# Patient Record
Sex: FEMALE | Race: WHITE | NOT HISPANIC OR LATINO | Employment: FULL TIME | URBAN - METROPOLITAN AREA
[De-identification: names, ages, dates, MRNs, and addresses within clinical notes are randomized per-mention and may not be internally consistent; named-entity substitution may affect disease eponyms.]

---

## 2021-06-14 ENCOUNTER — OFFICE VISIT (OUTPATIENT)
Dept: FAMILY MEDICINE CLINIC | Facility: CLINIC | Age: 31
End: 2021-06-14

## 2021-06-14 VITALS
BODY MASS INDEX: 22.79 KG/M2 | WEIGHT: 136.8 LBS | TEMPERATURE: 97.8 F | SYSTOLIC BLOOD PRESSURE: 106 MMHG | HEART RATE: 70 BPM | HEIGHT: 65 IN | OXYGEN SATURATION: 98 % | DIASTOLIC BLOOD PRESSURE: 62 MMHG

## 2021-06-14 DIAGNOSIS — Z02.89 ENCOUNTER FOR PHYSICAL EXAMINATION RELATED TO EMPLOYMENT: Primary | ICD-10-CM

## 2021-06-14 PROCEDURE — KIDS: Performed by: FAMILY MEDICINE

## 2021-06-14 RX ORDER — ACETAMINOPHEN AND CODEINE PHOSPHATE 120; 12 MG/5ML; MG/5ML
1 SOLUTION ORAL DAILY
COMMUNITY
Start: 2021-05-26 | End: 2022-04-28 | Stop reason: HOSPADM

## 2021-06-14 RX ORDER — FLUTICASONE PROPIONATE 50 MCG
2 SPRAY, SUSPENSION (ML) NASAL DAILY
COMMUNITY
Start: 2021-03-31 | End: 2022-04-28 | Stop reason: HOSPADM

## 2021-06-14 NOTE — PROGRESS NOTES
Assessment/Plan:  Physical form completed  See chart copy  1  Encounter for physical examination related to employment          Subjective:      Patient ID: Constantino Quinonez is a 35 y o  female  Patient here for [employer]   Generally feeling well  No concerns or complaints today  The following portions of the patient's history were reviewed and updated as appropriate: allergies, current medications, past family history, past medical history, past social history, past surgical history, and problem list     Review of Systems   Constitutional: Negative  HENT: Negative  Eyes: Negative  Respiratory: Negative  Cardiovascular: Negative  Gastrointestinal: Negative  Endocrine: Negative  Genitourinary: Negative  Musculoskeletal: Negative  Skin: Negative  Allergic/Immunologic: Negative  Neurological: Negative  Hematological: Negative  Psychiatric/Behavioral: Negative  Objective:      /62 (BP Location: Left arm, Patient Position: Sitting, Cuff Size: Adult)   Pulse 70   Temp 97 8 °F (36 6 °C) (Temporal)   Ht 5' 4 5" (1 638 m)   Wt 62 1 kg (136 lb 12 8 oz)   SpO2 98%   BMI 23 12 kg/m²          Physical Exam  Vitals reviewed  Constitutional:       Appearance: She is well-developed  HENT:      Head: Normocephalic and atraumatic  Right Ear: External ear normal  Tympanic membrane is not erythematous or bulging  Left Ear: External ear normal  Tympanic membrane is not erythematous or bulging  Nose: Nose normal       Mouth/Throat:      Mouth: No oral lesions  Pharynx: No oropharyngeal exudate  Eyes:      General: No scleral icterus  Right eye: No discharge  Left eye: No discharge  Conjunctiva/sclera: Conjunctivae normal    Neck:      Thyroid: No thyromegaly  Cardiovascular:      Rate and Rhythm: Normal rate and regular rhythm  Heart sounds: Normal heart sounds  No murmur heard  No friction rub  No gallop  Pulmonary:      Effort: Pulmonary effort is normal  No respiratory distress  Breath sounds: No wheezing or rales  Chest:      Chest wall: No tenderness  Abdominal:      General: Bowel sounds are normal  There is no distension  Palpations: Abdomen is soft  There is no mass  Tenderness: There is no abdominal tenderness  There is no guarding or rebound  Musculoskeletal:         General: No tenderness or deformity  Normal range of motion  Cervical back: Normal range of motion and neck supple  Lymphadenopathy:      Cervical: No cervical adenopathy  Skin:     General: Skin is warm and dry  Coloration: Skin is not pale  Findings: No erythema or rash  Neurological:      Mental Status: She is alert and oriented to person, place, and time  Cranial Nerves: No cranial nerve deficit  Motor: No abnormal muscle tone  Coordination: Coordination normal       Deep Tendon Reflexes: Reflexes are normal and symmetric     Psychiatric:         Behavior: Behavior normal

## 2022-03-16 ENCOUNTER — LAB REQUISITION (OUTPATIENT)
Dept: LAB | Facility: HOSPITAL | Age: 32
End: 2022-03-16

## 2022-03-16 DIAGNOSIS — N93.0 POSTCOITAL AND CONTACT BLEEDING: ICD-10-CM

## 2022-03-16 PROCEDURE — 88305 TISSUE EXAM BY PATHOLOGIST: CPT | Performed by: PATHOLOGY

## 2022-04-25 RX ORDER — CLASCOTERONE 1 G/100G
CREAM TOPICAL 2 TIMES DAILY
COMMUNITY

## 2022-04-25 RX ORDER — VITAMIN B COMPLEX
1000 TABLET ORAL DAILY
COMMUNITY

## 2022-04-25 RX ORDER — LORATADINE 10 MG/1
10 TABLET ORAL DAILY
COMMUNITY

## 2022-04-25 RX ORDER — CLINDAMYCIN PHOSPHATE 10 UG/ML
LOTION TOPICAL 2 TIMES DAILY
COMMUNITY

## 2022-04-25 NOTE — PRE-PROCEDURE INSTRUCTIONS
Pre-Surgery Instructions:   Medication Instructions    cholecalciferol (VITAMIN D3) 25 mcg (1,000 units) tablet Stop 4/25  Do not take morning of surgery    Clascoterone (Winlevi) 1 % CREA Do not apply morning of surgery    clindamycin (CLEOCIN T) 1 % lotion Do not apply morning of surgery    loratadine (CLARITIN) 10 mg tablet Take day of surgery  If needed   sertraline (ZOLOFT) 50 mg tablet Take day of surgery  Covid screening negative as per patient  Fully vaccinated  Reviewed showering and medication instructions  Take medications with a small sip of water  Patient verbalized understanding  Advised NPO after MN and ASC will call with scheduled surgical time

## 2022-04-26 ENCOUNTER — ANESTHESIA EVENT (OUTPATIENT)
Dept: PERIOP | Facility: HOSPITAL | Age: 32
End: 2022-04-26
Payer: COMMERCIAL

## 2022-04-26 PROCEDURE — NC001 PR NO CHARGE: Performed by: OBSTETRICS & GYNECOLOGY

## 2022-04-27 PROBLEM — N70.11 HYDROSALPINX: Status: ACTIVE | Noted: 2022-04-27

## 2022-04-27 NOTE — DISCHARGE INSTRUCTIONS
Post-Gynecologic Surgery Discharge Instructions:  1  No heavy lifting more than one full gallon of milk (about 8 lbs) for 1 week  2  Nothing in the vagina for 6 weeks, or until cleared at your post-operative visit  3  You may take stairs one at a time, touching each step with both feet for the first few days, then as tolerated  4  Call the office for fever greater than 100  4'F, heavy vaginal bleeding, or increasing pain  5  Activity as tolerated  6  Avoid driving if taking narcotic pain medications (Roxicodone, Percocet, Vicodin)  Post Operative Pain Management:  For pain, take 650 mg of tylenol every 6 hours  For cramping, take 600 mg Ibuprofen every 6 hours to relieve  You may alternate these and take them "around the clock" to stay ahead of pain  For example, take 650mg of tylenol at 9 AM, then 600mg of ibuprofen at 12 PM, then 650 of tylenol at 3 PM, and so forth  If you have any questions regarding your prescriptions please call your doctor

## 2022-04-28 ENCOUNTER — HOSPITAL ENCOUNTER (OUTPATIENT)
Facility: HOSPITAL | Age: 32
Setting detail: OUTPATIENT SURGERY
Discharge: HOME | End: 2022-04-28
Attending: OBSTETRICS & GYNECOLOGY | Admitting: OBSTETRICS & GYNECOLOGY
Payer: COMMERCIAL

## 2022-04-28 ENCOUNTER — ANESTHESIA (OUTPATIENT)
Dept: PERIOP | Facility: HOSPITAL | Age: 32
End: 2022-04-28
Payer: COMMERCIAL

## 2022-04-28 VITALS
DIASTOLIC BLOOD PRESSURE: 52 MMHG | SYSTOLIC BLOOD PRESSURE: 91 MMHG | BODY MASS INDEX: 23.18 KG/M2 | TEMPERATURE: 96.9 F | HEIGHT: 65 IN | OXYGEN SATURATION: 100 % | HEART RATE: 73 BPM | WEIGHT: 139.11 LBS | RESPIRATION RATE: 16 BRPM

## 2022-04-28 DIAGNOSIS — N83.8 CYST OF FALLOPIAN TUBE: Primary | ICD-10-CM

## 2022-04-28 DIAGNOSIS — N70.11 CHRONIC SALPINGITIS: ICD-10-CM

## 2022-04-28 LAB
EXT PREGNANCY TEST URINE: NEGATIVE
EXT. CONTROL: NORMAL

## 2022-04-28 PROCEDURE — 88305 TISSUE EXAM BY PATHOLOGIST: CPT | Performed by: PATHOLOGY

## 2022-04-28 PROCEDURE — 81025 URINE PREGNANCY TEST: CPT | Performed by: OBSTETRICS & GYNECOLOGY

## 2022-04-28 PROCEDURE — NC001 PR NO CHARGE: Performed by: OBSTETRICS & GYNECOLOGY

## 2022-04-28 RX ORDER — SODIUM CHLORIDE 9 MG/ML
125 INJECTION, SOLUTION INTRAVENOUS CONTINUOUS
Status: DISCONTINUED | OUTPATIENT
Start: 2022-04-28 | End: 2022-04-28 | Stop reason: HOSPADM

## 2022-04-28 RX ORDER — IBUPROFEN 600 MG/1
600 TABLET ORAL EVERY 6 HOURS PRN
Qty: 30 TABLET | Refills: 0
Start: 2022-04-28

## 2022-04-28 RX ORDER — ONDANSETRON 2 MG/ML
4 INJECTION INTRAMUSCULAR; INTRAVENOUS EVERY 6 HOURS PRN
Status: DISCONTINUED | OUTPATIENT
Start: 2022-04-28 | End: 2022-04-28 | Stop reason: HOSPADM

## 2022-04-28 RX ORDER — OXYCODONE HYDROCHLORIDE AND ACETAMINOPHEN 5; 325 MG/1; MG/1
1 TABLET ORAL EVERY 4 HOURS PRN
Qty: 5 TABLET | Refills: 0 | Status: SHIPPED | OUTPATIENT
Start: 2022-04-28 | End: 2022-05-08

## 2022-04-28 RX ORDER — OXYCODONE HYDROCHLORIDE 5 MG/1
5 TABLET ORAL EVERY 4 HOURS PRN
Status: DISCONTINUED | OUTPATIENT
Start: 2022-04-28 | End: 2022-04-28 | Stop reason: HOSPADM

## 2022-04-28 RX ORDER — DEXAMETHASONE SODIUM PHOSPHATE 10 MG/ML
INJECTION, SOLUTION INTRAMUSCULAR; INTRAVENOUS AS NEEDED
Status: DISCONTINUED | OUTPATIENT
Start: 2022-04-28 | End: 2022-04-28

## 2022-04-28 RX ORDER — HYDROMORPHONE HCL/PF 1 MG/ML
0.5 SYRINGE (ML) INJECTION
Status: DISCONTINUED | OUTPATIENT
Start: 2022-04-28 | End: 2022-04-28

## 2022-04-28 RX ORDER — PROPOFOL 10 MG/ML
INJECTION, EMULSION INTRAVENOUS AS NEEDED
Status: DISCONTINUED | OUTPATIENT
Start: 2022-04-28 | End: 2022-04-28

## 2022-04-28 RX ORDER — ONDANSETRON 2 MG/ML
INJECTION INTRAMUSCULAR; INTRAVENOUS AS NEEDED
Status: DISCONTINUED | OUTPATIENT
Start: 2022-04-28 | End: 2022-04-28

## 2022-04-28 RX ORDER — OXYCODONE HYDROCHLORIDE 5 MG/1
10 TABLET ORAL EVERY 4 HOURS PRN
Status: DISCONTINUED | OUTPATIENT
Start: 2022-04-28 | End: 2022-04-28 | Stop reason: HOSPADM

## 2022-04-28 RX ORDER — LIDOCAINE HYDROCHLORIDE 10 MG/ML
INJECTION, SOLUTION EPIDURAL; INFILTRATION; INTRACAUDAL; PERINEURAL AS NEEDED
Status: DISCONTINUED | OUTPATIENT
Start: 2022-04-28 | End: 2022-04-28

## 2022-04-28 RX ORDER — ONDANSETRON 2 MG/ML
4 INJECTION INTRAMUSCULAR; INTRAVENOUS ONCE AS NEEDED
Status: DISCONTINUED | OUTPATIENT
Start: 2022-04-28 | End: 2022-04-28 | Stop reason: HOSPADM

## 2022-04-28 RX ORDER — ACETAMINOPHEN 325 MG/1
975 TABLET ORAL EVERY 6 HOURS PRN
Status: DISCONTINUED | OUTPATIENT
Start: 2022-04-28 | End: 2022-04-28 | Stop reason: HOSPADM

## 2022-04-28 RX ORDER — IBUPROFEN 600 MG/1
600 TABLET ORAL EVERY 6 HOURS PRN
Status: DISCONTINUED | OUTPATIENT
Start: 2022-04-28 | End: 2022-04-28 | Stop reason: HOSPADM

## 2022-04-28 RX ORDER — MIDAZOLAM HYDROCHLORIDE 2 MG/2ML
INJECTION, SOLUTION INTRAMUSCULAR; INTRAVENOUS AS NEEDED
Status: DISCONTINUED | OUTPATIENT
Start: 2022-04-28 | End: 2022-04-28

## 2022-04-28 RX ORDER — FENTANYL CITRATE/PF 50 MCG/ML
25 SYRINGE (ML) INJECTION
Status: DISCONTINUED | OUTPATIENT
Start: 2022-04-28 | End: 2022-04-28 | Stop reason: HOSPADM

## 2022-04-28 RX ORDER — ROCURONIUM BROMIDE 10 MG/ML
INJECTION, SOLUTION INTRAVENOUS AS NEEDED
Status: DISCONTINUED | OUTPATIENT
Start: 2022-04-28 | End: 2022-04-28

## 2022-04-28 RX ORDER — FENTANYL CITRATE 50 UG/ML
INJECTION, SOLUTION INTRAMUSCULAR; INTRAVENOUS AS NEEDED
Status: DISCONTINUED | OUTPATIENT
Start: 2022-04-28 | End: 2022-04-28

## 2022-04-28 RX ORDER — BUPIVACAINE HYDROCHLORIDE 5 MG/ML
INJECTION, SOLUTION PERINEURAL AS NEEDED
Status: DISCONTINUED | OUTPATIENT
Start: 2022-04-28 | End: 2022-04-28 | Stop reason: HOSPADM

## 2022-04-28 RX ADMIN — ROCURONIUM BROMIDE 40 MG: 50 INJECTION, SOLUTION INTRAVENOUS at 10:33

## 2022-04-28 RX ADMIN — PROPOFOL 200 MG: 10 INJECTION, EMULSION INTRAVENOUS at 10:33

## 2022-04-28 RX ADMIN — FENTANYL CITRATE 100 MCG: 50 INJECTION INTRAMUSCULAR; INTRAVENOUS at 10:33

## 2022-04-28 RX ADMIN — ONDANSETRON 4 MG: 2 INJECTION INTRAMUSCULAR; INTRAVENOUS at 11:00

## 2022-04-28 RX ADMIN — LIDOCAINE HYDROCHLORIDE 80 MG: 10 INJECTION, SOLUTION EPIDURAL; INFILTRATION; INTRACAUDAL; PERINEURAL at 10:33

## 2022-04-28 RX ADMIN — MIDAZOLAM 2 MG: 1 INJECTION INTRAMUSCULAR; INTRAVENOUS at 10:23

## 2022-04-28 RX ADMIN — DEXAMETHASONE SODIUM PHOSPHATE 8 MG: 10 INJECTION INTRAMUSCULAR; INTRAVENOUS at 10:54

## 2022-04-28 RX ADMIN — SODIUM CHLORIDE 125 ML/HR: 0.9 INJECTION, SOLUTION INTRAVENOUS at 09:03

## 2022-04-28 RX ADMIN — SUGAMMADEX 300 MG: 100 INJECTION, SOLUTION INTRAVENOUS at 11:05

## 2022-04-28 NOTE — OP NOTE
OPERATIVE REPORT  PATIENT NAME: Caitlyn Noonan    :  1990  MRN: [id number]  Pt Location: [facility] ROOM 06    SURGERY DATE: 2022    Surgeon(s) and Role:     Jory Pratt DO - Primary     * Evangelina Monahan DO - Assisting    Preop Diagnosis:  Chronic salpingitis [N70 11]    Post-Op Diagnosis Codes:     * Chronic salpingitis [N70 11]    Procedure(s) (LRB):  LAPAROSCOPY DIAGNOSTIC (N/A)  EXCISION CYST TUBAL (Left)    Specimen(s):  ID Type Source Tests Collected by Time Destination   1 : paratubal cyst Tissue Fallopian Tube, Left TISSUE EXAM Gwene No, DO 2022 1101        Estimated Blood Loss: 5 cc    Anesthesia Type: GETA    Operative Indications:  Chronic salpingitis [N70 11]  Suspected hydrosalpinx    Operative Findings:  1  Normal external female genitalia  2  No difficulty with laparoscopic entry  3  Normal appearing uterus, bilateral ovaries  4  Two para tubular cysts seen on the left side, no hydrosalpinx noted  Please see photos in media tab  These were removed with the EnSeal device  Complications:   None    Procedure and Technique:    Patient was taken to the operating room where correct patient and correct procedure were confirmed  General endotracheal anesthesia (GET) was administered and the patient was positioned on the OR table in the dorsal lithotomy position  All pressure points were padded and a yohannes hugger was placed to maintain control of core body temperature  The patient was prepped and draped in the usual sterile fashion with chloroprep on the abdomen and chlorhexidine prep on the vagina and perineum  A time out was performed  Operative Technique    A straight catheter was introduced into the bladder, which was drained of 100 cc of clear yellow urine  A sponge stick was inserted into the vagina for manipulation  Sterile gloves were then exchanged and attention was turned to the abdomen       A 5mm incision was made at the inferior edge of the umbilicus for introduction of a 5 mm trocar  Trocar was introduced under direct visualization  Pneumoperitoneum was then established to a maximum of 15mmHg  The entire abdomen and pelvis was inspected and there was no evidence of injury to bowel, bladder, vasculature, or other structures  Attention was then turned to the pelvis  Patient was placed in Trendelenburg and the uterus was elevated to visualize the fallopian tubes  There was noted to be grossly normal tubes and ovaries bilaterally with 2 small para tubular cyst seen on left side  An additional port sites was selected on the right lower abdomen approximately 2cm superior and medial to the iliac crests  A 5 mm incision was made for introduction of a 5 mm trocar under direct visualization  A [place] grasper was inserted through this port and used to visualize the pelvis  The paratubular cysts were removed with the EnSeal device and good hemostasis was noted    The pneumoperitoneum was allowed to escape  Adequate hemostasis was visualized  The inferior trocars were removed under direct visualization  She was given Valsalva breaths to reduce postoperative shoulder pain by the anesthesiologist  The laparoscope was withdrawn from the abdomen, followed by its trocar sleeve at the umbilicus  Skin incisions were closed with 4-0 monocryl with exofin  Attention was turned to the vagina and the sponge stick was removed    At the conclusion of the procedure, all needle, sponge, and instrument counts were noted to be correct x2  She was given sugammadex for post anesthetic reversal  Patient tolerated the procedure well and was transferred to PACU in stable condition prior to discharge with follow up in 1-2 weeks  Dr Sumanth May was present and participated in the entire case         Patient Disposition:  PACU       SIGNATURE: Jones Cintron DO  DATE: April 28, 2022  TIME: 11:11 AM

## 2022-04-28 NOTE — INTERVAL H&P NOTE
Vitals:    04/28/22 0847   BP: 114/70   Pulse: 76   Resp: 16   Temp: 98 4 °F (36 9 °C)   SpO2: 100%     H&P reviewed  After examining the patient I find no changes in the patient's condition since the H&P had been written

## 2022-04-28 NOTE — ANESTHESIA PREPROCEDURE EVALUATION
Procedure:  SALPINGECTOMY, LAPAROSCOPIC (Left Uterus)    Relevant Problems   ANESTHESIA (within normal limits)      NEURO/PSYCH   (+) OCD (obsessive compulsive disorder)      Other   (+) Brain lesion   (+) Hydrosalpinx        Physical Exam    Airway    Mallampati score: II  TM Distance: >3 FB  Neck ROM: full     Dental   No notable dental hx     Cardiovascular  Rhythm: regular, Rate: normal, Cardiovascular exam normal    Pulmonary  Pulmonary exam normal Breath sounds clear to auscultation,     Other Findings        Anesthesia Plan  ASA Score- 2     Anesthesia Type- general with ASA Monitors  Additional Monitors:   Airway Plan: ETT  Plan Factors-Exercise tolerance (METS): >4 METS  Chart reviewed  Existing labs reviewed  Patient summary reviewed  Patient is not a current smoker  Induction- intravenous  Postoperative Plan-     Informed Consent- Anesthetic plan and risks discussed with patient

## 2022-09-01 ENCOUNTER — TELEPHONE (OUTPATIENT)
Dept: HEMATOLOGY ONCOLOGY | Facility: CLINIC | Age: 32
End: 2022-09-01

## 2022-09-01 NOTE — TELEPHONE ENCOUNTER
09/01/22    Spoke with pt today checking If pt has any labs done for his upcoming appt  She is referred for APS but I didn't see any related-labs in  Pt reports she has elevated antiphospholipid antibodies and hx of 5 miscarriages  She states her OB/GYN  ordered the tests in July and she will email them to me  My direct email address provided

## 2022-09-01 NOTE — TELEPHONE ENCOUNTER
Pt's labs received and uploaded to her chart- media  As Dr Josefina Mccrary request, pt will need to eval and her labs need to be interpreted by more experienced hematologist  I r/s pt to see Dr Jone Cm on 9/20 and pt is notified  Pt verbalized understanding and agreement  She knows she can call us back with any questions

## 2022-09-20 ENCOUNTER — CONSULT (OUTPATIENT)
Dept: HEMATOLOGY ONCOLOGY | Facility: CLINIC | Age: 32
End: 2022-09-20
Payer: COMMERCIAL

## 2022-09-20 VITALS
OXYGEN SATURATION: 98 % | HEIGHT: 64 IN | HEART RATE: 75 BPM | BODY MASS INDEX: 23.73 KG/M2 | RESPIRATION RATE: 16 BRPM | SYSTOLIC BLOOD PRESSURE: 100 MMHG | WEIGHT: 139 LBS | DIASTOLIC BLOOD PRESSURE: 60 MMHG | TEMPERATURE: 98.2 F

## 2022-09-20 DIAGNOSIS — D68.61 ANTIPHOSPHOLIPID ANTIBODY SYNDROME (HCC): ICD-10-CM

## 2022-09-20 PROCEDURE — 99204 OFFICE O/P NEW MOD 45 MIN: CPT | Performed by: INTERNAL MEDICINE

## 2022-09-20 RX ORDER — LEVOTHYROXINE SODIUM 0.03 MG/1
TABLET ORAL DAILY
COMMUNITY
Start: 2022-08-10

## 2022-09-20 RX ORDER — ASPIRIN 81 MG/1
81 TABLET ORAL DAILY
COMMUNITY

## 2022-09-24 LAB
LA PPP-IMP: NORMAL
MIXING STUDIES PPP-IMP: NORMAL
SCREEN APTT: 32 SEC
SCREEN DRVVT: 33 SEC

## 2022-09-24 NOTE — PROGRESS NOTES
Oncology Outpatient Consult Note  Sujit Moran 28 y o  female MRN: @ Encounter: [id number]        Date:  9/24/2022        CC: multiple miscarriages        HPI:  Sujit Moran is seen for initial consultation 9/24/2022 regarding her multiple miscarriages  He history is as follows:    1 miscarriage at 5 5 weeks  Positive LA screen documented at [facility] [place] after the miscarriage   1 live birth  1 miscarriage at 10 weeks  1 miscarriage at 5 weeks  1 live birth with a vanishing twin - took a baby ASA for this pregnancy  I miscarriage at 5 weeks (this was in June 2022)    The patient is currently 6 weeks pregnant  She had a positive LA screen at [place] in 2016 and a low titer of 16 for anti-cardiolipin IgM  These labs were done at the time because she was being worked up for neurologic symptoms  She has also tested negative for FVL  She has never had a thrombotic event in the past   She is on a baby ASA for this pregnancy  Her gyn also sent a phosphatidylserine titer that was 40  Current titers of anticardiolipin and beta2 glycoprotein antibodies are negative  There is no recent LA screen  She denies any bleeding during the pregnancy so far  On 4/28/22 she had salpingitis with removed at the tube  She currently works as a nurse for [employer]  No tob or EToh  Famhx:    MGM - ovarian cancer  PGM - overian cancer  MGF - colon cancer  Mat uncle - colon cancer    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  ROS: As stated in history of present illness otherwise her 14 point review of systems today was negative  ECOG PS: 0    Test Results:    Imaging: No results found      Labs: No results found for: WBC, HGB, HCT, MCV, PLT  No results found for: NA, K, CL, CO2, ANIONGAP, BUN, CREATININE, GLUCOSE, GLUF, CALCIUM, CORRECTEDCA, AST, ALT, ALKPHOS, PROT, BILITOT, EGFR      No results found for: SPEP, UPEP    No results found for: PSA    No results found for: CEA    No results found for: AFP    No results found for: IRON, TIBC, FERRITIN    No results found for: INRUPRCM81            Active Problems:   Patient Active Problem List   Diagnosis    Cyst of fallopian tube    Brain lesion    OCD (obsessive compulsive disorder)       Past Medical History:   Past Medical History:   Diagnosis Date    Acne     Anxiety     Brain lesion     serial MRI'S-since  cleared    Irritable bowel syndrome     Migraines     OCD (obsessive compulsive disorder)     Seasonal allergies     Wears glasses        Surgical History:   Past Surgical History:   Procedure Laterality Date    DILATION AND CURETTAGE OF UTERUS      EXCISION CYST TUBAL Left 4/28/2022    Procedure: EXCISION CYST TUBAL;  Surgeon: Claudia Sun DO;  [facility];  Service: Gynecology    WA LAP,DIAGNOSTIC ABDOMEN N/A 4/28/2022    Procedure: LAPAROSCOPY DIAGNOSTIC;  Surgeon: Claudia Sun DO;  [facility];  Service: Gynecology    TONSILLECTOMY         Family History:  No family history on file  Cancer-related family history is not on file  Social History:   Social History     Socioeconomic History    Marital status: /Civil Union     Spouse name: Not on file    Number of children: Not on file    Years of education: Not on file    Highest education level: Not on file   Occupational History    Not on file   Tobacco Use    Smoking status: Never Smoker    Smokeless tobacco: Never Used   Vaping Use    Vaping Use: Never used   Substance and Sexual Activity    Alcohol use:  Yes     Alcohol/week: 1 0 standard drink     Types: 1 Standard drinks or equivalent per week     Comment: 3  x  weekly    Drug use: Never    Sexual activity: Yes   Other Topics Concern    Not on file   Social History Narrative    Not on file     Social Determinants of Health     Financial Resource Strain: Not on file   Food Insecurity: Not on file   Transportation Needs: Not on file   Physical Activity: Not on file   Stress: Not on file   Social Connections: Not on file   Intimate Partner Violence: Not on file   Housing Stability: Not on file       Current Medications:   Current Outpatient Medications   Medication Sig Dispense Refill    aspirin (ECOTRIN LOW STRENGTH) 81 mg EC tablet Take 81 mg by mouth daily      levothyroxine 25 mcg tablet in the morning      loratadine (CLARITIN) 10 mg tablet Take 10 mg by mouth daily      Prenatal Vit-Fe Fumarate-FA (PRENATAL COMPLETE PO) Take by mouth in the morning      progesterone (ENDOMETRIN) 100 MG vaginal insert Insert 100 mg into the vagina 2 (two) times a day      sertraline (ZOLOFT) 50 mg tablet       cholecalciferol (VITAMIN D3) 25 mcg (1,000 units) tablet Take 1,000 Units by mouth daily (Patient not taking: Reported on 9/20/2022)      Clascoterone (Winlevi) 1 % CREA Apply topically 2 (two) times a day (Patient not taking: Reported on 9/20/2022)      clindamycin (CLEOCIN T) 1 % lotion Apply topically 2 (two) times a day (Patient not taking: Reported on 9/20/2022)      ibuprofen (MOTRIN) 600 mg tablet Take 1 tablet (600 mg total) by mouth every 6 (six) hours as needed for moderate pain (Patient not taking: Reported on 9/20/2022) 30 tablet 0     No current facility-administered medications for this visit  Allergies: Allergies   Allergen Reactions    Other Allergic Rhinitis     Patient has seasonal allergies - but nothing that requires any special medication         Physical Exam:    Body surface area is 1 68 meters squared      Wt Readings from Last 3 Encounters:   09/20/22 63 kg (139 lb)   04/28/22 63 1 kg (139 lb 1 8 oz)   06/14/21 62 1 kg (136 lb 12 8 oz)        Temp Readings from Last 3 Encounters:   09/20/22 98 2 °F (36 8 °C) (Temporal)   04/28/22 (!) 96 9 °F (36 1 °C) (Temporal)   06/14/21 97 8 °F (36 6 °C) (Temporal)        BP Readings from Last 3 Encounters:   09/20/22 100/60   04/28/22 91/52   06/14/21 106/62         Pulse Readings from Last 3 Encounters:   09/20/22 75   04/28/22 73 06/14/21 70     @LASTSAO2(3)@    Physical Exam     Constitutional   General appearance: No acute distress, well appearing and well nourished  Eyes   Conjunctiva and lids: No swelling, erythema or discharge  Pupils and irises: Equal, round and reactive to light  Ears, Nose, Mouth, and Throat   External inspection of ears and nose: Normal     Nasal mucosa, septum, and turbinates: Normal without edema or erythema  Oropharynx: Normal with no erythema, edema, exudate or lesions  Pulmonary   Respiratory effort: No increased work of breathing or signs of respiratory distress  Auscultation of lungs: Clear to auscultation  Cardiovascular   Palpation of heart: Normal PMI, no thrills  Auscultation of heart: Normal rate and rhythm, normal S1 and S2, without murmurs  Examination of extremities for edema and/or varicosities: Normal     Carotid pulses: Normal     Abdomen   Abdomen: Non-tender, no masses  Liver and spleen: No hepatomegaly or splenomegaly  Lymphatic   Palpation of lymph nodes in neck: No lymphadenopathy  Musculoskeletal   Gait and station: Normal     Digits and nails: Normal without clubbing or cyanosis  Inspection/palpation of joints, bones, and muscles: Normal     Skin   Skin and subcutaneous tissue: Normal without rashes or lesions  Neurologic   Cranial nerves: Cranial nerves 2-12 intact  Sensation: No sensory loss  Psychiatric   Orientation to person, place, and time: Normal     Mood and affect: Normal           Assessment/ Plan:  28 female with multiple miscarriages and 2 live births  Phosphatidylserine is not a standard part of the workup for MIR as it is not a very reliable test   It is not part of the laboratory criteria for APLA  I am going to repeat a LA schedule as this was positive in the past   If this is positive, I will start her on prophyactic lovenox at 40 mg daily for this pregnancy    I am in agreement with continuing the baby ASA throughout this pregnancy  I will call her with that result  If APLA is diagnosed and she does need lovenox, I will see her closer to delivery to make the transition to heparin  She also has a RAD51D mutation  There are no recommendations for cancer screening or surgical risk reduction until age 36  We will decide on follow up after her LA screen is resulted  I spent 45 minutes in chart review, face to face counseling, coordination of care, and documentation

## 2022-09-30 ENCOUNTER — TELEPHONE (OUTPATIENT)
Dept: HEMATOLOGY ONCOLOGY | Facility: CLINIC | Age: 32
End: 2022-09-30

## 2022-09-30 NOTE — TELEPHONE ENCOUNTER
I called the patient to let her know that her LA screen was negative  At this time, I do not have a reason to add lovenox as she does not meet lab criteria for APLA  She can continue to ASA throughout the pregnancy  I would stop it 7 days prior to due date if she wants to have an epidural   I will see her in follow up on as needed basis

## 2024-08-03 ENCOUNTER — APPOINTMENT (OUTPATIENT)
Dept: LAB | Facility: MEDICAL CENTER | Age: 34
End: 2024-08-03
Payer: COMMERCIAL

## 2024-08-03 DIAGNOSIS — R10.11 RUQ PAIN: ICD-10-CM

## 2024-08-03 DIAGNOSIS — K80.20 GALLBLADDER STONE WITHOUT CHOLECYSTITIS OR OBSTRUCTION: ICD-10-CM

## 2024-08-03 LAB
ALBUMIN SERPL BCG-MCNC: 4.3 G/DL (ref 3.5–5)
ALP SERPL-CCNC: 44 U/L (ref 34–104)
ALT SERPL W P-5'-P-CCNC: 13 U/L (ref 7–52)
ANION GAP SERPL CALCULATED.3IONS-SCNC: 5 MMOL/L (ref 4–13)
AST SERPL W P-5'-P-CCNC: 14 U/L (ref 13–39)
BILIRUB SERPL-MCNC: 0.6 MG/DL (ref 0.2–1)
BUN SERPL-MCNC: 12 MG/DL (ref 5–25)
CALCIUM SERPL-MCNC: 9.1 MG/DL (ref 8.4–10.2)
CHLORIDE SERPL-SCNC: 105 MMOL/L (ref 96–108)
CO2 SERPL-SCNC: 29 MMOL/L (ref 21–32)
CREAT SERPL-MCNC: 0.77 MG/DL (ref 0.6–1.3)
GFR SERPL CREATININE-BSD FRML MDRD: 102 ML/MIN/1.73SQ M
GLUCOSE P FAST SERPL-MCNC: 87 MG/DL (ref 65–99)
LIPASE SERPL-CCNC: 16 U/L (ref 11–82)
POTASSIUM SERPL-SCNC: 3.9 MMOL/L (ref 3.5–5.3)
PROT SERPL-MCNC: 7 G/DL (ref 6.4–8.4)
SODIUM SERPL-SCNC: 139 MMOL/L (ref 135–147)

## 2024-08-03 PROCEDURE — 80053 COMPREHEN METABOLIC PANEL: CPT

## 2024-08-03 PROCEDURE — 36415 COLL VENOUS BLD VENIPUNCTURE: CPT

## 2024-08-03 PROCEDURE — 83690 ASSAY OF LIPASE: CPT

## 2025-03-25 ENCOUNTER — OFFICE VISIT (OUTPATIENT)
Dept: FAMILY MEDICINE CLINIC | Facility: CLINIC | Age: 35
End: 2025-03-25
Payer: COMMERCIAL

## 2025-03-25 VITALS
HEART RATE: 73 BPM | HEIGHT: 64 IN | SYSTOLIC BLOOD PRESSURE: 110 MMHG | DIASTOLIC BLOOD PRESSURE: 72 MMHG | WEIGHT: 131.4 LBS | TEMPERATURE: 97.2 F | BODY MASS INDEX: 22.43 KG/M2 | OXYGEN SATURATION: 98 %

## 2025-03-25 DIAGNOSIS — Z12.4 SCREENING FOR CERVICAL CANCER: ICD-10-CM

## 2025-03-25 DIAGNOSIS — Z02.89 ENCOUNTER FOR PHYSICAL EXAMINATION RELATED TO EMPLOYMENT: Primary | ICD-10-CM

## 2025-03-25 DIAGNOSIS — Z11.1 SCREENING FOR TUBERCULOSIS: ICD-10-CM

## 2025-03-25 PROCEDURE — KIDS: Performed by: FAMILY MEDICINE

## 2025-03-25 NOTE — PROGRESS NOTES
"Name: Lorene Dalal      : 1990      MRN: [id number]  Encounter Provider: Kenton Mai DO  Encounter Date: 3/25/2025   Encounter department: [facility]  :  Assessment & Plan  Encounter for physical examination related to employment  [facility] physical form completed.  See chart copy.       Screening for cervical cancer         Screening for tuberculosis    Orders:    Quantiferon TB Gold Plus Assay; Future           History of Present Illness   Patient is seen today for [facility] physical.  She is generally feeling well.  She is requesting an order be put in for just testing with QuantiFERON gold testing.      Review of Systems   Constitutional: Negative.    HENT: Negative.     Eyes: Negative.    Respiratory: Negative.     Cardiovascular: Negative.    Gastrointestinal: Negative.    Endocrine: Negative.    Genitourinary: Negative.    Musculoskeletal: Negative.    Skin: Negative.    Allergic/Immunologic: Negative.    Neurological: Negative.    Hematological: Negative.    Psychiatric/Behavioral: Negative.         Objective   /72 (BP Location: Left arm, Patient Position: Sitting, Cuff Size: Standard)   Pulse 73   Temp (!) 97.2 °F (36.2 °C) (Tympanic)   Ht 5' 4\" (1.626 m)   Wt 59.6 kg (131 lb 6.4 oz)   SpO2 98%   BMI 22.55 kg/m²      Physical Exam  Constitutional:       General: She is not in acute distress.     Appearance: She is well-developed. She is not diaphoretic.   HENT:      Head: Normocephalic and atraumatic.      Right Ear: External ear normal.      Left Ear: External ear normal.      Nose: Nose normal.      Mouth/Throat:      Pharynx: Oropharynx is clear.   Eyes:      General: No scleral icterus.        Right eye: No discharge.         Left eye: No discharge.      Conjunctiva/sclera: Conjunctivae normal.      Pupils: Pupils are equal, round, and reactive to light.   Neck:      Thyroid: No thyromegaly.      Vascular: No JVD.      Trachea: No tracheal " deviation.   Cardiovascular:      Rate and Rhythm: Normal rate and regular rhythm.      Heart sounds: Normal heart sounds. No murmur heard.     No friction rub. No gallop.   Pulmonary:      Effort: Pulmonary effort is normal. No respiratory distress.      Breath sounds: Normal breath sounds. No wheezing or rales.   Chest:      Chest wall: No tenderness.   Abdominal:      General: Bowel sounds are normal. There is no distension.      Palpations: Abdomen is soft. There is no mass.      Tenderness: There is no abdominal tenderness. There is no guarding or rebound.      Hernia: No hernia is present.   Musculoskeletal:         General: No tenderness or deformity. Normal range of motion.      Cervical back: Normal range of motion and neck supple.   Lymphadenopathy:      Cervical: No cervical adenopathy.   Skin:     General: Skin is warm and dry.      Capillary Refill: Capillary refill takes less than 2 seconds.      Coloration: Skin is not pale.      Findings: No erythema or rash.   Neurological:      Mental Status: She is alert and oriented to person, place, and time.      Cranial Nerves: No cranial nerve deficit.      Sensory: No sensory deficit.      Motor: No abnormal muscle tone.      Coordination: Coordination normal.      Deep Tendon Reflexes: Reflexes normal.   Psychiatric:         Mood and Affect: Mood normal.         Behavior: Behavior normal.

## 2025-03-26 LAB
GAMMA INTERFERON BACKGROUND BLD IA-ACNC: 0.1 IU/ML
M TB IFN-G BLD-IMP: NEGATIVE
M TB IFN-G CD4+ BCKGRND COR BLD-ACNC: 0 IU/ML
M TB IFN-G CD4+ BCKGRND COR BLD-ACNC: 0 IU/ML
MITOGEN IGNF BLD-ACNC: >10 IU/ML
QUANTIFERON INCUBATION COMMENT: NORMAL

## 2025-03-27 ENCOUNTER — RESULTS FOLLOW-UP (OUTPATIENT)
Dept: FAMILY MEDICINE CLINIC | Facility: CLINIC | Age: 35
End: 2025-03-27

## 2025-04-02 ENCOUNTER — EVALUATION (OUTPATIENT)
Dept: PHYSICAL THERAPY | Facility: CLINIC | Age: 35
End: 2025-04-02
Payer: COMMERCIAL

## 2025-04-02 DIAGNOSIS — N81.6 PELVIC ORGAN PROLAPSE QUANTIFICATION STAGE 2 RECTOCELE: ICD-10-CM

## 2025-04-02 DIAGNOSIS — N81.10 PELVIC ORGAN PROLAPSE QUANTIFICATION STAGE 2 CYSTOCELE: Primary | ICD-10-CM

## 2025-04-02 PROCEDURE — 97530 THERAPEUTIC ACTIVITIES: CPT | Performed by: PHYSICAL THERAPIST

## 2025-04-02 PROCEDURE — 97162 PT EVAL MOD COMPLEX 30 MIN: CPT | Performed by: PHYSICAL THERAPIST

## 2025-04-02 NOTE — PROGRESS NOTES
PT Evaluation     Today's date: 2025  Patient name: Lorene Dalal  : 1990  MRN: [id number]  Referring provider: Eric Stephenson PA-C  Dx:   Encounter Diagnosis     ICD-10-CM    1. Pelvic organ prolapse quantification stage 2 cystocele  N81.10       2. Pelvic organ prolapse quantification stage 2 rectocele  N81.6                      Assessment  Impairments: activity intolerance, impaired physical strength, lacks appropriate home exercise program, pain with function, poor posture , poor body mechanics, activity limitations and endurance  Symptom irritability: moderate  Understanding of Dx/Px/POC: excellent     Prognosis: excellent    Goals  (Up to 8 weeks)  1. Independent and safe with PF HEP.  2. Independent and safe with body mechanics and PFM activation to prevent VADIM with dynamic ADL's.  3. Independent and safe with correct abdominal breathing tech to prevent straining with teherx/HEP.  4. PF MMT increases by 1/2-1 grade t/o to min abdominal pressure with standing ADL's/exercises.    Plan  Patient would benefit from: skilled physical therapy    Planned therapy interventions: manual therapy, neuromuscular re-education, patient/caregiver education, postural training, strengthening, stretching, therapeutic activities, therapeutic exercise, home exercise program, graded exercise, graded activity, flexibility, behavior modification, breathing training, activity modification, body mechanics training and abdominal trunk stabilization    Frequency: 1x week  Duration in weeks: 8  Treatment plan discussed with: patient        PT Pelvic Floor Subjective:   History of Present Illness:   Pt is 35 y/o female with Hx of pelvic pressure for years, Recently pt started an intense core exercise program that worsen her PF symptoms. Pt went to see her OBGYN and was referred to [facility] for PF revaluation and tx.  Current functional limitations: (+) straining with BM, multiple BM in a day, pelvic pressure with dynamic  "ADL's, feeling of PF heaviness and almost \"crowing\" in perineum post BM.Date of onset: 1/1/2025          Recurrent probem    Quality of life: fair    Social Support:     Lives in:  Multiple-level home    Lives with:  Spouse and young children    Relationship status: /committed    Work status: employed part time (a nurse: combination of sitting/standing)    Life stress level: 4    Life stress severity: moderate    History of Depression: yesPronouns: she/her  Hand dominance:  Left  Diet and Exercise:    Diet:balanced nutrition    Exercise type: strengthening exercise program, combination activity and walking    Exercise frequency: daily  OB/ gyn History    Gestational History:     Prior Pregnancy: Yes      Number of prior pregnancies: 7+    Number of term pregnancies: 3    Delivery Type: vaginal delivery      Number of vaginal deliveries: 3 (2015:7.8 lbs, 2019: 8.lbs, 2023: 8.3 lbs)    Delivery Complications:  Fist child: (+) use of forceps  no delivery complications    Menstrual History:    Date of last menstrual period: 4/2/2025    Menstrual irregularities regular menses    Painful periods:  No difficulty managing menstrual pain    Tolerates tampons: no    Birth control: no contraception  Bladder Function:     Voiding Difficulties negative for: urgency, frequent urination, straining and incomplete emptying       Voiding Difficulties comments:     Voiding frequency: every 3-4 hours    Urinary leakage: urine leakage    Urinary leakage aggravated by: sneezing and exercise (jumping)    Urinary leakage not aggravated by: post-void dribble    Painful urination: No      Fluid Intake Type:  Water, tea, coffee and alcohol    Intake (ounces): Water intake (oz): 40 oz. Coffee intake (oz): 12 oz. Tea intake (oz): 12 oz herbal. Alcohol intake (oz): 3 glasses on the weekends.  Incontinence Management:     Pads/Diaper Use:  None    Patient has attempted at least 4 weeks of independent pelvic floor strengthening exercises " "without a resolution of symptoms  Bowel Function:     Voiding DIfficulties: unfinished feeling after defecating      Bowel Function comments:  Already use a step stool at home for BM.    Bowel frequency: daily and multiple times a day    [place] Stool Scale: type 4    Stool softener use: no stool softeners    Enema use: no enema    Uses \"squatty potty\": Squatty Potty  Sexual Function:     Sexually Active:  Sexually active    Pain during intercourse: Yes      Lubrication Use: No      pain does not cause abstinenceSexual function: vaginal pain  Pain:     No pain reported by patient.    Aggravating factors:  [place]    Duration of symptoms:  Brief    Relieving factors:  Change in position and relaxation    Progression:  No change  Diagnostic Tests:     None    Treatments:     Previous treatment:  Physical therapy  Patient Goals:     Patient goals for therapy:  Fully empty bladder or bowels, improved bladder or bowel function, improved comfort, improved quality of life, decreased pain and improved pain management    Other patient goals:  To prevent worsening of pelvic prolapse      Objective     Postural Observations  Seated posture: fair  Standing posture: fair  Correction of posture: has no consistent effect      Active Range of Motion     Lumbar   Normal active range of motion    Strength/Myotome Testing     Lumbar   Left   Normal strength    Right   Normal strength      Abdominal Assessment:      Abdominal Assessment:  Abdominal findings with curl up: supine with knees flexed.    Diastatis   Diastasis recti present: yes  3\" above umbilicus (# fingers): 2  Umbilicus (# fingers): 2  3\" below umbilicus (# fingers): 1  Connective tissue integrity at linea alba: firm  no tenderness at linea alba  able to engage transverse abdominis     Skin inspection:   scars present.   Additional skin inspection details: Multiple small fully healed, old incisions t/o abdomen. No restrictions, no hypersensitivity.      General " Perineum Exam:   Positive for descent and gaping introitus.     Visual Inspection of Perineum:   Excursion of perineal body in cephalad direction with contraction of pelvic floor muscles (PFM): weak  Excursion of perineal body in caudal direction with relaxation of pelvic floor muscles (PFM): good   Involuntary contraction with coughing: no  Involuntary relaxation with bearing down: no  [place] swab test: non-tender  Cough reflex: cough reflex  Sphincter Tone Resting: normal  Sphincter Tone Squeeze: normal  Sensation: intact  Tenderness: unprovoked    Pelvic Organ Prolapse   Position: hook-lying  At rest: mild  With bearing down: mild (>1cm from hymenal remnants)  Location: anterior and posterior  Perineal body inspection: within normal limits        Pelvic Floor Muscle Exam:     Muscle Contraction: substitution   Breathing pattern with contraction: holding breath   Pelvic floor muscle relaxation is complete.         PERFECT Score   Power right: 2+/5   Power left: 2+/5   Endurance (seconds to max): 3   Repetitions (before fatigue): 3   Fast flicks (in 10 seconds): 3      Rectal Pelvic Floor Muscle Exam    External anal sphincter: wink reflex intact    pelvic floor exam consent given by patient    Pelvic exam completed: vaginally and rectally                Precautions: not any given      Manuals 4/2            PF MMT 2+/5 w decrease quick and prolong hold            PF assessment (+) min rectocele, cystocele                                      Neuro Re-Ed                                                                                                        Ther Ex             Supine PF/TA demo            Abdominal breathing demo                                                                                          Ther Activity             [facility] edu/review 8'                         Gait Training                                       Modalities

## 2025-04-10 ENCOUNTER — OFFICE VISIT (OUTPATIENT)
Dept: PHYSICAL THERAPY | Facility: CLINIC | Age: 35
End: 2025-04-10
Payer: COMMERCIAL

## 2025-04-10 DIAGNOSIS — N81.10 PELVIC ORGAN PROLAPSE QUANTIFICATION STAGE 2 CYSTOCELE: Primary | ICD-10-CM

## 2025-04-10 PROCEDURE — 97112 NEUROMUSCULAR REEDUCATION: CPT | Performed by: PHYSICAL THERAPIST

## 2025-04-10 PROCEDURE — 97530 THERAPEUTIC ACTIVITIES: CPT | Performed by: PHYSICAL THERAPIST

## 2025-04-10 PROCEDURE — 97110 THERAPEUTIC EXERCISES: CPT | Performed by: PHYSICAL THERAPIST

## 2025-04-10 NOTE — PROGRESS NOTES
"Daily Note     Today's date: 4/10/2025  Patient name: Lorene Dalal  : 1990  MRN: [id number]  Referring provider: Eric Stephenson PA-C  Dx:   Encounter Diagnosis     ICD-10-CM    1. Pelvic organ prolapse quantification stage 2 cystocele  N81.10                      Subjective: Pt reports performing daily HEP. More awareness with rectal PFM activation with HEP use.      Objective: See treatment diary below      Assessment: Tolerated treatment well. Patient demonstrated fatigue post treatment and would benefit from continued PT for further progression of PFM strengthening in various positions. HEP was updated and reviewed.      Plan: Continue per plan of care.  Progress treatment as tolerated.       Precautions: not any given      Manuals 4/2 4/10           PF MMT 2+/5 w decrease quick and prolong hold            PF assessment (+) min rectocele, cystocele                                      Neuro Re-Ed                                       Rec bike/posture/PF  L2 10'                                                               Ther Ex             Supine PF/TA demo 3x3\" review           Abdominal breathing demo np           Supine AD's t-ball use PF/TA  5x3\" ea VC  s           Supine AB's TB use PF/TA  5x3\" ea VC's           Bridge PF/TA  5x3\" VC's           S/l PF/TA  5x3\" ea side           Butterfly stretch  3x30\"                                                                                                      Ther Activity             HEP edu/review  8' update/review                        Gait Training                                       Modalities                                            "

## 2025-04-10 NOTE — HOME EXERCISE EDUCATION
Program_ID:[id number]   Access Code: [email or web address]  URL: [email or web address]  Date: 04-  Prepared By: Cherie Wiggins    Program Notes      Exercises      - Pelvic Floor Contractions in Hooklying with Adduction - 2 x daily - 7 x weekly - 1 sets - 10 reps - 3-5 sec hold      - Pelvic Floor Contractions in Hooklying with Resisted Abduction - 2 x daily - 7 x weekly - 1 sets - 10 reps - 3-5 sec hold      - Supine Bridge with Pelvic Floor Contraction - 2 x daily - 7 x weekly - 1 sets - 10 reps - 3-5 sec hold      - Sidelying Pelvic Floor Contraction with Self-Palpation - 2 x daily - 7 x weekly - 1 sets - 10 reps - 3-5 sec hold      - Supine Butterfly Groin Stretch - 2 x daily - 7 x weekly - 1 sets - 3-5 reps - 30 sec hold

## 2025-04-16 ENCOUNTER — OFFICE VISIT (OUTPATIENT)
Dept: PHYSICAL THERAPY | Facility: CLINIC | Age: 35
End: 2025-04-16
Payer: COMMERCIAL

## 2025-04-16 DIAGNOSIS — N81.6 PELVIC ORGAN PROLAPSE QUANTIFICATION STAGE 2 RECTOCELE: ICD-10-CM

## 2025-04-16 DIAGNOSIS — N81.10 PELVIC ORGAN PROLAPSE QUANTIFICATION STAGE 2 CYSTOCELE: Primary | ICD-10-CM

## 2025-04-16 PROCEDURE — 97112 NEUROMUSCULAR REEDUCATION: CPT | Performed by: PHYSICAL THERAPIST

## 2025-04-16 PROCEDURE — 97110 THERAPEUTIC EXERCISES: CPT | Performed by: PHYSICAL THERAPIST

## 2025-04-16 NOTE — PROGRESS NOTES
"Daily Note     Today's date: 2025  Patient name: Lorene Dalal  : 1990  MRN: [id number]  Referring provider: Eric Stephenson PA-C  Dx:   Encounter Diagnosis     ICD-10-CM    1. Pelvic organ prolapse quantification stage 2 cystocele  N81.10       2. Pelvic organ prolapse quantification stage 2 rectocele  N81.6                      Subjective: Pt reports performing daily HEP.      Objective: See treatment diary below      Assessment: Tolerated treatment well. Patient demonstrated fatigue post treatment and would benefit from continued PT for further PFM strengthening as chinmay. HEP was updated and reviewed.      Plan: Continue per plan of care.  Progress treatment as tolerated.       Precautions: not any given      Manuals 4/2 4/10 4/16          PF MMT 2+/5 w decrease quick and prolong hold  next          PF assessment (+) min rectocele, cystocele                                      Neuro Re-Ed                                       Rec bike/posture/PF  L2 10' L2 10'                                                              Ther Ex             Supine PF/TA demo 3x3\" review           Abdominal breathing demo np           Supine AD's t-ball use PF/TA  5x3\" ea VC  s           Supine AB's TB use PF/TA  5x3\" ea VC's           Bridge PF/TA  5x3\" VC's           S/l PF/TA  5x3\" ea side           Butterfly stretch  3x30\"           Child pose stretch   3x30\" VC's          Happy baby stretch   3x30\" VC's          S/l clam shells PF/TA   5x3\" ea side VC's          Bridge w LE lift PF/TA   5x3\" ea side          Quadruped PF/TA   10x3\" VC's                                                                           Ther Activity             HEP edu/review 8' 8' update/review 8 updated                       Gait Training                                       Modalities                                              "

## 2025-04-25 ENCOUNTER — OFFICE VISIT (OUTPATIENT)
Dept: PHYSICAL THERAPY | Facility: CLINIC | Age: 35
End: 2025-04-25
Payer: COMMERCIAL

## 2025-04-25 DIAGNOSIS — N81.6 PELVIC ORGAN PROLAPSE QUANTIFICATION STAGE 2 RECTOCELE: ICD-10-CM

## 2025-04-25 DIAGNOSIS — N81.10 PELVIC ORGAN PROLAPSE QUANTIFICATION STAGE 2 CYSTOCELE: Primary | ICD-10-CM

## 2025-04-25 PROCEDURE — 97140 MANUAL THERAPY 1/> REGIONS: CPT | Performed by: PHYSICAL THERAPIST

## 2025-04-25 PROCEDURE — 97112 NEUROMUSCULAR REEDUCATION: CPT | Performed by: PHYSICAL THERAPIST

## 2025-04-25 PROCEDURE — 97110 THERAPEUTIC EXERCISES: CPT | Performed by: PHYSICAL THERAPIST

## 2025-04-25 NOTE — HOME EXERCISE EDUCATION
Program_ID:[id number]   Access Code: [email or web address]  URL: [email or web address]  Date: 04-  Prepared By: Cherie Wiggins    Program Notes      Exercises      - Pelvic Floor Contractions in Hooklying with Adduction - 2 x daily - 7 x weekly - 1 sets - 10 reps - 3-5 sec hold      - Pelvic Floor Contractions in Hooklying with Resisted Abduction - 2 x daily - 7 x weekly - 1 sets - 10 reps - 3-5 sec hold      - Supine Bridge with Pelvic Floor Contraction - 2 x daily - 7 x weekly - 1 sets - 10 reps - 3-5 sec hold      - Sidelying Pelvic Floor Contraction with Self-Palpation - 2 x daily - 7 x weekly - 1 sets - 10 reps - 3-5 sec hold      - Supine Butterfly Groin Stretch - 2 x daily - 7 x weekly - 1 sets - 3-5 reps - 30 sec hold      - Diaphragmatic Breathing in Child&#39;s Pose with Pelvic Floor Relaxation - 2 x daily - 7 x weekly - 1 sets - 3-5 reps - 30 sec hold      - Supine Pelvic Floor Stretch - 2 x daily - 7 x weekly - 1 sets - 3-5 reps - 30 sec hold      - Sidelying Clamshell with Pelvic Floor Contraction - 2 x daily - 7 x weekly - 1 sets - 10 reps - 3 sec hold      - Quadruped Exhale with Pelvic Floor Contraction - 2 x daily - 7 x weekly - 1 sets - 10 reps - 3 sec hold      - Supine Bridge with Knee Extension and Pelvic Floor Contraction - 2 x daily - 7 x weekly - 1 sets - 10 reps - 3 sec hold      - Quadruped Pelvic Floor Contraction with Opposite Arm and Leg Lift - 2 x daily - 7 x weekly - 1 sets - 10 reps - 5 sec hold      - Quadruped Cat Cow - 2 x daily - 7 x weekly - 1 sets - 10 reps - 5 sec hold

## 2025-04-25 NOTE — PROGRESS NOTES
"Daily Note     Today's date: 2025  Patient name: Lorene Dalal  : 1990  MRN: [id number]  Referring provider: Eric Stephenson PA-C  Dx:   Encounter Diagnosis     ICD-10-CM    1. Pelvic organ prolapse quantification stage 2 cystocele  N81.10       2. Pelvic organ prolapse quantification stage 2 rectocele  N81.6                      Subjective: No change in symptoms,c/o same pelvic pressure with prolong standing.      Objective: See treatment diary below      Assessment: Tolerated treatment well. Patient shows some improvement in PFM quick flicks contraction, same PF MMT. Pt is safe with all PF HEP. Pt was educated on possible use of a pessary if needed with continuation of pelvic pressure. Also use of a pelvic support underwear and a revive bladder support was recommended.      Plan: D/C with HEP and re-assessment in 4-5 months to progress in POC.     Precautions: not any given      Manuals 4/2 4/10 4/16 4/25         PF MMT 2+/5 w decrease quick and prolong hold  next 2+/5 with good quick control and still limited prolong hold endurance         PF assessment (+) min rectocele, cystocele                                      Neuro Re-Ed                                       Rec bike/posture/PF  L2 10' L2 10' L2 10'                                                             Ther Ex             Supine PF/TA demo 3x3\" review           Abdominal breathing demo np           Supine AD's t-ball use PF/TA  5x3\" ea VC  s           Supine AB's TB use PF/TA  5x3\" ea VC's           Bridge PF/TA  5x3\" VC's           S/l PF/TA  5x3\" ea side           Butterfly stretch  3x30\"           Child pose stretch   3x30\" VC's 3x30\"         Happy baby stretch   3x30\" VC's          S/l clam shells PF/TA   5x3\" ea side VC's          Bridge w LE lift PF/TA   5x3\" ea side          Quadruped PF/TA   10x3\" VC's 10x5\"         Quadruped cat/cow/PF    5x5\" VC's         Quadruped apt UE/LE lift PF/TA    5x5\" ea side VC's                    "                             Ther Activity             HEP edu/review 8' 8' update/review 8' updated 5' review         Revive bladder support/benefits/purpose    5' info given         Pelvic prolapse support underwear    5' info given                      Gait Training                                       Modalities

## (undated) DEVICE — TROCAR: Brand: KII® SLEEVE

## (undated) DEVICE — CHLORAPREP HI-LITE 26ML ORANGE

## (undated) DEVICE — ADHESIVE SKIN HIGH VISCOSITY EXOFIN 1ML

## (undated) DEVICE — GLOVE INDICATOR PI UNDERGLOVE SZ 7.5 BLUE

## (undated) DEVICE — STERILE SURGICAL LUBRICANT,  TUBE: Brand: SURGILUBE

## (undated) DEVICE — BLUE HEAT SCOPE WARMER

## (undated) DEVICE — BETHLEHEM UNIVERSAL GYN LAP PK: Brand: CARDINAL HEALTH

## (undated) DEVICE — PREMIUM DRY TRAY LF: Brand: MEDLINE INDUSTRIES, INC.

## (undated) DEVICE — SCD SEQUENTIAL COMPRESSION COMFORT SLEEVE MEDIUM KNEE LENGTH: Brand: KENDALL SCD

## (undated) DEVICE — TROCAR: Brand: KII FIOS FIRST ENTRY

## (undated) DEVICE — SUT MONOCRYL 4-0 PS-2 27 IN Y426H

## (undated) DEVICE — PVC URETHRAL CATHETER: Brand: DOVER

## (undated) DEVICE — [HIGH FLOW INSUFFLATOR,  DO NOT USE IF PACKAGE IS DAMAGED,  KEEP DRY,  KEEP AWAY FROM SUNLIGHT,  PROTECT FROM HEAT AND RADIOACTIVE SOURCES.]: Brand: PNEUMOSURE

## (undated) DEVICE — INTENDED FOR TISSUE SEPARATION, AND OTHER PROCEDURES THAT REQUIRE A SHARP SURGICAL BLADE TO PUNCTURE OR CUT.: Brand: BARD-PARKER SAFETY BLADES SIZE 11, STERILE

## (undated) DEVICE — GLOVE PI ULTRA TOUCH SZ.7.0

## (undated) DEVICE — ENSEAL X1 TISSUE SEALER, CURVED JAW, 37 CM SHAFT LENGTH: Brand: ENSEAL

## (undated) DEVICE — DRAPE EQUIPMENT RF WAND